# Patient Record
Sex: FEMALE | Race: BLACK OR AFRICAN AMERICAN | NOT HISPANIC OR LATINO | Employment: STUDENT | URBAN - METROPOLITAN AREA
[De-identification: names, ages, dates, MRNs, and addresses within clinical notes are randomized per-mention and may not be internally consistent; named-entity substitution may affect disease eponyms.]

---
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
## 2021-04-11 ENCOUNTER — APPOINTMENT (OUTPATIENT)
Dept: PREADMISSION TESTING | Facility: HOSPITAL | Age: 21
End: 2021-04-11

## 2021-04-11 PROCEDURE — U0004 COV-19 TEST NON-CDC HGH THRU: HCPCS

## 2021-04-11 PROCEDURE — C9803 HOPD COVID-19 SPEC COLLECT: HCPCS

## 2021-04-12 LAB — SARS-COV-2 RNA NOSE QL NAA+PROBE: NOT DETECTED

## 2023-07-10 PROBLEM — I10 HYPERTENSION: Status: ACTIVE | Noted: 2023-07-10

## 2023-07-10 PROBLEM — F41.9 ANXIETY: Status: ACTIVE | Noted: 2023-07-10

## 2023-07-10 PROBLEM — E28.2 PCOS (POLYCYSTIC OVARIAN SYNDROME): Status: ACTIVE | Noted: 2023-07-10

## 2023-07-26 ENCOUNTER — OFFICE VISIT (OUTPATIENT)
Dept: INTERNAL MEDICINE | Facility: CLINIC | Age: 23
End: 2023-07-26
Payer: MEDICAID

## 2023-07-26 ENCOUNTER — LAB (OUTPATIENT)
Dept: LAB | Facility: HOSPITAL | Age: 23
End: 2023-07-26
Payer: MEDICAID

## 2023-07-26 VITALS
OXYGEN SATURATION: 95 % | HEART RATE: 76 BPM | TEMPERATURE: 97.1 F | BODY MASS INDEX: 47.09 KG/M2 | WEIGHT: 293 LBS | HEIGHT: 66 IN | SYSTOLIC BLOOD PRESSURE: 128 MMHG | DIASTOLIC BLOOD PRESSURE: 88 MMHG

## 2023-07-26 DIAGNOSIS — M54.50 ACUTE MIDLINE LOW BACK PAIN WITHOUT SCIATICA: ICD-10-CM

## 2023-07-26 DIAGNOSIS — Z09 FOLLOW-UP EXAM: Primary | ICD-10-CM

## 2023-07-26 DIAGNOSIS — Z76.89 ENCOUNTER TO ESTABLISH CARE: ICD-10-CM

## 2023-07-26 DIAGNOSIS — Z13.29 SCREENING FOR HYPOTHYROIDISM: ICD-10-CM

## 2023-07-26 DIAGNOSIS — Z13.220 SCREENING FOR HYPERLIPIDEMIA: ICD-10-CM

## 2023-07-26 RX ORDER — KETOROLAC TROMETHAMINE 30 MG/ML
30 INJECTION, SOLUTION INTRAMUSCULAR; INTRAVENOUS ONCE
Status: COMPLETED | OUTPATIENT
Start: 2023-07-26 | End: 2023-07-26

## 2023-07-26 RX ORDER — DEXAMETHASONE SODIUM PHOSPHATE 10 MG/ML
10 INJECTION INTRAMUSCULAR; INTRAVENOUS ONCE
Status: COMPLETED | OUTPATIENT
Start: 2023-07-26 | End: 2023-07-26

## 2023-07-26 RX ORDER — TIZANIDINE 4 MG/1
4 TABLET ORAL EVERY 8 HOURS PRN
Qty: 15 TABLET | Refills: 1 | Status: SHIPPED | OUTPATIENT
Start: 2023-07-26

## 2023-07-26 RX ADMIN — DEXAMETHASONE SODIUM PHOSPHATE 10 MG: 10 INJECTION INTRAMUSCULAR; INTRAVENOUS at 11:09

## 2023-07-26 RX ADMIN — KETOROLAC TROMETHAMINE 30 MG: 30 INJECTION, SOLUTION INTRAMUSCULAR; INTRAVENOUS at 11:22

## 2023-07-27 LAB
ALBUMIN SERPL-MCNC: 4.5 G/DL (ref 3.5–5.2)
ALBUMIN/GLOB SERPL: 1.9 G/DL
ALP SERPL-CCNC: 71 U/L (ref 39–117)
ALT SERPL-CCNC: 23 U/L (ref 1–33)
AST SERPL-CCNC: 16 U/L (ref 1–32)
BILIRUB SERPL-MCNC: 0.8 MG/DL (ref 0–1.2)
BUN SERPL-MCNC: 7 MG/DL (ref 6–20)
BUN/CREAT SERPL: 8.8 (ref 7–25)
CALCIUM SERPL-MCNC: 9.6 MG/DL (ref 8.6–10.5)
CHLORIDE SERPL-SCNC: 103 MMOL/L (ref 98–107)
CHOLEST SERPL-MCNC: 236 MG/DL (ref 0–200)
CO2 SERPL-SCNC: 23.7 MMOL/L (ref 22–29)
CREAT SERPL-MCNC: 0.8 MG/DL (ref 0.57–1)
EGFRCR SERPLBLD CKD-EPI 2021: 106.3 ML/MIN/1.73
ERYTHROCYTE [DISTWIDTH] IN BLOOD BY AUTOMATED COUNT: 12.5 % (ref 12.3–15.4)
GLOBULIN SER CALC-MCNC: 2.4 GM/DL
GLUCOSE SERPL-MCNC: 91 MG/DL (ref 65–99)
HCT VFR BLD AUTO: 44.2 % (ref 34–46.6)
HDLC SERPL-MCNC: 37 MG/DL (ref 40–60)
HGB BLD-MCNC: 14.8 G/DL (ref 12–15.9)
LDLC SERPL CALC-MCNC: 180 MG/DL (ref 0–100)
MCH RBC QN AUTO: 30.3 PG (ref 26.6–33)
MCHC RBC AUTO-ENTMCNC: 33.5 G/DL (ref 31.5–35.7)
MCV RBC AUTO: 90.6 FL (ref 79–97)
PLATELET # BLD AUTO: 270 10*3/MM3 (ref 140–450)
POTASSIUM SERPL-SCNC: 4.9 MMOL/L (ref 3.5–5.2)
PROT SERPL-MCNC: 6.9 G/DL (ref 6–8.5)
RBC # BLD AUTO: 4.88 10*6/MM3 (ref 3.77–5.28)
SODIUM SERPL-SCNC: 141 MMOL/L (ref 136–145)
T4 FREE SERPL-MCNC: 1.06 NG/DL (ref 0.93–1.7)
TRIGL SERPL-MCNC: 103 MG/DL (ref 0–150)
TSH SERPL DL<=0.005 MIU/L-ACNC: 1.29 UIU/ML (ref 0.27–4.2)
VLDLC SERPL CALC-MCNC: 19 MG/DL (ref 5–40)
WBC # BLD AUTO: 4.75 10*3/MM3 (ref 3.4–10.8)

## 2023-08-12 NOTE — PROGRESS NOTES
Office Note     Name: Avel Pichardo    : 2000     MRN: [id number]     Chief Complaint  Back Pain (Hurt it last Thursday working out )    Subjective     History of Present Illness:  Avel Pichardo is a 23 y.o. female who presents today for a follow-up visit and for lab review.  Unable to do lab review today as patient forgot to have her labs done.  She does however have complaints of acute back pain.  Patient reports onset of symptoms was Thursday.  She reports she was dead lifting and her form was off.  She did not hear a pop.  She was trying to lift about 120 pounds.  She reports the pain is to her midline lumbar area.  She denies any sciatic pain.  She reports the discomfort feels muscular in nature.  Sitting and walking increased the pain.  She has tried stretching, Epsom salt baths, capsaicin cream, ice, IcyHot, Tylenol, ibuprofen, and naproxen for her symptoms with minimal relief.  She denies further complaints or concerns at this time.  Had a pleasant visit with the patient today.      Past Medical History:   Diagnosis Date    Adiposity 2016    Airway hyperreactivity 2016    Allergic rhinitis 2016    Anxiety     Depression     Hyperlipemia     Hypertension     Ovarian cyst     PAC (premature atrial contraction)        Past Surgical History:   Procedure Laterality Date    NO PAST SURGERIES      WISDOM TOOTH EXTRACTION Bilateral        Social History     Socioeconomic History    Marital status: Single   Tobacco Use    Smoking status: Never    Smokeless tobacco: Never   Substance and Sexual Activity    Alcohol use: No    Drug use: Never    Sexual activity: Not Currently     Partners: Male     Birth control/protection: Abstinence, Birth control pill         Current Outpatient Medications:     amLODIPine (NORVASC) 10 MG tablet, Take 1 tablet by mouth Daily., Disp: , Rfl:     citalopram (CeleXA) 20 MG tablet, TAKE 1 TABLET (20 MG TOTAL) BY MOUTH EVERY MORNING FOR 90 DAYS, Disp: ,  "Rfl:     ergocalciferol (ERGOCALCIFEROL) 1.25 MG (81893 UT) capsule, Take 1 capsule by mouth Every 7 (Seven) Days., Disp: , Rfl:     hydrOXYzine pamoate (VISTARIL) 25 MG capsule, Take 1 capsule by mouth Daily., Disp: , Rfl:     metFORMIN (GLUCOPHAGE) 500 MG tablet, Take 1 tablet by mouth 2 (Two) Times a Day With Meals., Disp: , Rfl:     norethindrone (MICRONOR) 0.35 MG tablet, Take 1 tablet by mouth Daily., Disp: 28 tablet, Rfl: 2    propranolol (INDERAL) 20 MG tablet, Take 2 tablets by mouth., Disp: , Rfl:     tiZANidine (Zanaflex) 4 MG tablet, Take 1 tablet by mouth Every 8 (Eight) Hours As Needed for Muscle Spasms., Disp: 15 tablet, Rfl: 1    Objective     Vital Signs  /88   Pulse 76   Temp 97.1 øF (36.2 øC)   Ht 167.6 cm (65.98\")   Wt (!) 141 kg (310 lb)   SpO2 95%   BMI 50.06 kg/mý   Estimated body mass index is 50.06 kg/mý as calculated from the following:    Height as of this encounter: 167.6 cm (65.98\").    Weight as of this encounter: 141 kg (310 lb).           Physical Exam  Constitutional:       General: She is not in acute distress.     Appearance: Normal appearance. She is not ill-appearing.   HENT:      Head: Normocephalic and atraumatic.      Nose: Nose normal.   Eyes:      Extraocular Movements: Extraocular movements intact.      Conjunctiva/sclera: Conjunctivae normal.      Pupils: Pupils are equal, round, and reactive to light.   Cardiovascular:      Rate and Rhythm: Normal rate.   Pulmonary:      Effort: Pulmonary effort is normal. No respiratory distress.   Musculoskeletal:         General: Tenderness present.      Cervical back: Neck supple.      Comments: Antalgic gait noted   Skin:     General: Skin is warm and dry.   Neurological:      General: No focal deficit present.      Mental Status: She is alert and oriented to person, place, and time. Mental status is at baseline.   Psychiatric:         Mood and Affect: Mood normal.         Behavior: Behavior normal.         Thought " Content: Thought content normal.         Judgment: Judgment normal.        Assessment and Plan     Diagnoses and all orders for this visit:    1. Follow-up exam (Primary)    2. Acute midline low back pain without sciatica  -     ketorolac (TORADOL) injection 30 mg  -     dexamethasone (DECADRON) injection 10 mg  -     tiZANidine (Zanaflex) 4 MG tablet; Take 1 tablet by mouth Every 8 (Eight) Hours As Needed for Muscle Spasms.  Dispense: 15 tablet; Refill: 1    Plan:  Toradol 30 mg IM x1 in the office today.  Decadron 10 mg IM x1 in the office today.  Prescription for Zanaflex sent to the pharmacy to use as needed for low back pain.  Patient agreeable to have labs drawn today.  Will review labs with patient once received.  Further plan of care based on lab result findings.  Plan for follow-up in about 4 weeks.  Return to clinic sooner if needed.    Follow Up  Return in about 4 weeks (around 8/23/2023) for Recheck.    JANETTE Hale    Part of this note may be an electronic transcription/translation of spoken language to printed text using the Dragon Dictation System.Answers submitted by the patient for this visit:  Primary Reason for Visit (Submitted on 7/25/2023)  What is the primary reason for your visit?: Back Pain  Back Pain Questionnaire (Submitted on 7/25/2023)  Chief Complaint: Back pain  Chronicity: new  Onset: in the past 7 days  Frequency: constantly  Progression since onset: gradually worsening  Pain location: sacro-iliac  Pain quality: aching, shooting  Radiates to: does not radiate  Pain - numeric: 8/10  Pain is: the same all the time  Aggravated by: bending, coughing, position, lying down, sitting, standing, stress, twisting  Stiffness is present: all day  abdominal pain: No  bladder incontinence: No  bowel incontinence: No  chest pain: No  dysuria: No  fever: No  headaches: No  leg pain: No  numbness: No  paresis: No  paresthesias: No  pelvic pain: No  perianal numbness: No  tingling: Yes  weakness:  Yes  weight loss: No  Risk factors: obesity, recent trauma

## 2023-09-07 DIAGNOSIS — R73.01 IMPAIRED FASTING GLUCOSE: ICD-10-CM

## 2023-09-07 DIAGNOSIS — M25.50 ARTHRALGIA, UNSPECIFIED JOINT: ICD-10-CM

## 2023-09-07 DIAGNOSIS — E28.2 PCOS (POLYCYSTIC OVARIAN SYNDROME): Primary | ICD-10-CM

## 2023-09-14 ENCOUNTER — TELEPHONE (OUTPATIENT)
Dept: INTERNAL MEDICINE | Facility: CLINIC | Age: 23
End: 2023-09-14

## 2023-09-14 NOTE — TELEPHONE ENCOUNTER
Caller: Avel Pichardo    Relationship: Self    Best call back number: [phone]     What is the best time to reach you: ANY BEFORE 11:30-9 PM    Who are you requesting to speak with (clinical staff, provider,  specific staff member): TAN VILLALTA    What was the call regarding: THE PATIENT WANTED TO LET THE OFFICE KNOW THAT THE REASON SHE MISSED HER APPOINTMENT THIS MORNING 09.14.23 WAS BECAUSE SHE GOT INTO A FENDER HILL ON THE WAY TO THE OFFICE. SHE SAID SHE IS OK BUT WAS NOT ABLE TO MAKE IT IN. SHE WILL RESCHEDULE THROUGH [employer]. IF SHE HAS TROUBLE WITH THAT THE PATIENT SAID THAT SHE WILL CALL THE SCHEDULE. IF TAN VILLALTA HAS QUESTIONS FOR HER OR NEEDS TO SEE HER SOONER PLEASE CALL HER.     Is it okay if the provider responds through [employer]: NO

## 2023-09-26 ENCOUNTER — TELEPHONE (OUTPATIENT)
Dept: OBSTETRICS AND GYNECOLOGY | Facility: CLINIC | Age: 23
End: 2023-09-26
Payer: MEDICAID

## 2023-09-27 ENCOUNTER — OFFICE VISIT (OUTPATIENT)
Dept: OBSTETRICS AND GYNECOLOGY | Facility: CLINIC | Age: 23
End: 2023-09-27
Payer: MEDICAID

## 2023-09-27 VITALS
WEIGHT: 293 LBS | SYSTOLIC BLOOD PRESSURE: 116 MMHG | HEIGHT: 66 IN | BODY MASS INDEX: 47.09 KG/M2 | DIASTOLIC BLOOD PRESSURE: 72 MMHG

## 2023-09-27 DIAGNOSIS — Z01.419 ROUTINE GYNECOLOGICAL EXAMINATION: Primary | ICD-10-CM

## 2023-09-27 DIAGNOSIS — Z30.09 GENERAL COUNSELING AND ADVICE ON FEMALE CONTRACEPTION: ICD-10-CM

## 2023-09-27 NOTE — PROGRESS NOTES
Gynecologic Annual Exam Note        Establish Care        Subjective     HPI  Avel Pichardo is a 23 y.o.  female who presents for annual well woman exam as a new patient. There were no changes to her medical or surgical history since her last visit.. Patient reports problems with: none. No LMP recorded. (Menstrual status: Oral contraceptives).  Her periods are irregular . Patient reports flow is heavy. She reports dysmenorrhea is none.     Partner Status: Marital Status: single.  She has never been sexually active. STD testing recommendations have been explained to the patient and she does desire STD testing.    Additional OB/GYN History   Current contraception: contraceptive methods: Abstinence  Desires to: start contraception  Thromboembolic Disease: none  Age of menarche: 14-15    History of STD: no    Last Pap : Never.   Last Completed Pap Smear       This patient has no relevant Health Maintenance data.             History of abnormal Pap smear:  n/a  Gardasil status:uncertain if she received the vaccine  Family history of uterine, colon, breast, or ovarian cancer: no  Performs monthly Self-Breast Exam: no  Exercises Regularly:yes  Feelings of Anxiety or Depression: yes - managed with medication  Tobacco Usage?: No       Current Outpatient Medications:     amLODIPine (NORVASC) 10 MG tablet, Take 1 tablet by mouth Daily., Disp: , Rfl:     citalopram (CeleXA) 20 MG tablet, TAKE 1 TABLET (20 MG TOTAL) BY MOUTH EVERY MORNING FOR 90 DAYS, Disp: , Rfl:     hydrOXYzine pamoate (VISTARIL) 25 MG capsule, Take 1 capsule by mouth Daily., Disp: , Rfl:      Patient denies the need for medication refills today.    OB History          0    Para   0    Term   0       0    AB   0    Living   0         SAB   0    IAB   0    Ectopic   0    Molar   0    Multiple   0    Live Births   0                Health Maintenance   Topic Date Due    Annual Gynecologic Pelvic and Breast Exam  Never done     "Pneumococcal Vaccine 0-64 (1 - PCV) Never done    HPV VACCINES (1 - 2-dose series) Never done    HEPATITIS C SCREENING  Never done    CHLAMYDIA SCREENING  Never done    PAP SMEAR  Never done    ANNUAL PHYSICAL  02/09/2019    TDAP/TD VACCINES (2 - Td or Tdap) 04/08/2021    COVID-19 Vaccine (4 - Pfizer series) 05/07/2022    INFLUENZA VACCINE  10/01/2023    BMI FOLLOWUP  06/27/2024    LIPID PANEL  07/26/2024       Past Medical History:   Diagnosis Date    Adiposity 08/04/2016    Airway hyperreactivity 08/04/2016    Allergic rhinitis 08/04/2016    Anxiety     Asthma     Depression     Eczema     Hyperlipemia     Hypertension     Lactose intolerance     Needle phobia     Obstructive sleep apnea     PAC (premature atrial contraction)     PCOS (polycystic ovarian syndrome)     Polycystic ovary syndrome         Past Surgical History:   Procedure Laterality Date    COSMETIC SURGERY Right     birth sammie removal-right cheek    WISDOM TOOTH EXTRACTION Bilateral        The additional following portions of the patient's history were reviewed and updated as appropriate: allergies, current medications, past family history, past medical history, past social history, and past surgical history.    Review of Systems   Constitutional: Negative.    HENT: Negative.     Eyes: Negative.    Respiratory: Negative.     Cardiovascular: Negative.    Gastrointestinal: Negative.    Endocrine: Negative.    Genitourinary: Negative.    Musculoskeletal: Negative.    Skin: Negative.    Allergic/Immunologic: Negative.    Neurological: Negative.    Hematological: Negative.    Psychiatric/Behavioral: Negative.         I have reviewed and agree with the HPI, ROS, and historical information as entered above. Aram Ventura, APRN          Objective   /72 (BP Location: Right arm, Patient Position: Sitting, Cuff Size: Adult)   Ht 167.6 cm (65.98\")   Wt 136 kg (299 lb 9.6 oz)   BMI 48.38 kg/m²     Physical Exam  Vitals and nursing note " reviewed. Exam conducted with a chaperone present.   Constitutional:       Appearance: Normal appearance. She is well-developed and normal weight.   HENT:      Head: Normocephalic and atraumatic.   Neck:      Thyroid: No thyroid mass or thyromegaly.   Cardiovascular:      Rate and Rhythm: Normal rate.      Heart sounds: No murmur heard.  Pulmonary:      Effort: Pulmonary effort is normal. No retractions.      Breath sounds: No wheezing, rhonchi or rales.   Chest:      Chest wall: No mass or tenderness.   Breasts:     Right: Normal. No mass, nipple discharge, skin change or tenderness.      Left: Normal. No mass, nipple discharge, skin change or tenderness.   Abdominal:      Palpations: Abdomen is soft. Abdomen is not rigid. There is no mass.      Tenderness: There is no abdominal tenderness. There is no guarding.      Hernia: No hernia is present.   Genitourinary:     General: Normal vulva.      Exam position: Lithotomy position.      Labia:         Right: No rash, tenderness or lesion.         Left: No rash, tenderness or lesion.       Vagina: Normal. No vaginal discharge or lesions.      Cervix: No cervical motion tenderness, discharge, lesion or cervical bleeding.      Uterus: Normal. Not enlarged, not fixed and not tender.       Adnexa: Right adnexa normal and left adnexa normal.        Right: No mass or tenderness.          Left: No mass or tenderness.        Rectum: Normal. No external hemorrhoid.   Musculoskeletal:      Cervical back: Normal range of motion. No muscular tenderness.   Neurological:      Mental Status: She is alert and oriented to person, place, and time.   Psychiatric:         Behavior: Behavior normal.          Assessment and Plan    Problem List Items Addressed This Visit    None  Visit Diagnoses       Routine gynecological examination    -  Primary    Relevant Orders    LIQUID-BASED PAP SMEAR WITH HPV GENOTYPING IF ASCUS ([facility])    General counseling and advice on female contraception  "               GYN annual well woman exam.   Contraception: Avel has not been taking the pop because she says she often forgets. She also says she doesn't \"feel like herself\" while taking it. She is interested in Kyleena IUD and we discussed benefits, risks, and insertion. No unprotected IC 2 weeks prior to insertion. She will schedule this. Information brochure given to pt.  Reviewed pap guidelines. Pap done today.  Encouraged use of condoms for STD prevention.  Fibrocystic breast changes - Encouraged decreasing caffeine, supportive bra, low dose vitamin E supplementation.  Reviewed monthly self breast exams.  Instructed to call with lumps, pain, or breast discharge.    Reviewed exercise as a preventative health measures.   Reccommended Flu Vaccine in Fall of each year.  RTC in 1 year or PRN with problems      Aram Ventura, APRN  09/27/2023         "

## 2023-10-02 LAB — REF LAB TEST METHOD: NORMAL

## 2023-10-24 ENCOUNTER — OFFICE VISIT (OUTPATIENT)
Dept: INTERNAL MEDICINE | Facility: CLINIC | Age: 23
End: 2023-10-24
Payer: MEDICAID

## 2023-10-24 VITALS
HEART RATE: 74 BPM | OXYGEN SATURATION: 97 % | WEIGHT: 293 LBS | BODY MASS INDEX: 47.09 KG/M2 | SYSTOLIC BLOOD PRESSURE: 128 MMHG | TEMPERATURE: 96.8 F | HEIGHT: 66 IN | DIASTOLIC BLOOD PRESSURE: 84 MMHG

## 2023-10-24 DIAGNOSIS — Z23 NEED FOR INFLUENZA VACCINATION: ICD-10-CM

## 2023-10-24 DIAGNOSIS — Z09 FOLLOW-UP EXAMINATION: Primary | ICD-10-CM

## 2023-10-24 DIAGNOSIS — I10 HYPERTENSION, UNSPECIFIED TYPE: ICD-10-CM

## 2023-10-24 DIAGNOSIS — G47.9 SLEEP DISTURBANCE: ICD-10-CM

## 2023-10-24 DIAGNOSIS — F41.9 ANXIETY: ICD-10-CM

## 2023-10-24 DIAGNOSIS — E55.9 VITAMIN D DEFICIENCY: ICD-10-CM

## 2023-10-24 RX ORDER — CITALOPRAM 20 MG/1
20 TABLET ORAL DAILY
Qty: 90 TABLET | Refills: 1 | Status: SHIPPED | OUTPATIENT
Start: 2023-10-24

## 2023-10-24 RX ORDER — CITALOPRAM 20 MG/1
20 TABLET ORAL DAILY
Qty: 90 TABLET | Refills: 1 | Status: SHIPPED | OUTPATIENT
Start: 2023-10-24 | End: 2023-10-24 | Stop reason: SDUPTHER

## 2023-10-24 RX ORDER — AMLODIPINE BESYLATE 10 MG/1
10 TABLET ORAL DAILY
Qty: 90 TABLET | Refills: 1 | Status: SHIPPED | OUTPATIENT
Start: 2023-10-24 | End: 2023-10-24 | Stop reason: SDUPTHER

## 2023-10-24 RX ORDER — AMLODIPINE BESYLATE 10 MG/1
10 TABLET ORAL DAILY
Qty: 90 TABLET | Refills: 1 | Status: SHIPPED | OUTPATIENT
Start: 2023-10-24

## 2023-10-24 RX ORDER — HYDROXYZINE PAMOATE 25 MG/1
25 CAPSULE ORAL DAILY
Qty: 90 CAPSULE | Refills: 1 | Status: SHIPPED | OUTPATIENT
Start: 2023-10-24

## 2023-10-24 RX ORDER — HYDROXYZINE PAMOATE 25 MG/1
25 CAPSULE ORAL DAILY
Qty: 90 CAPSULE | Refills: 1 | Status: SHIPPED | OUTPATIENT
Start: 2023-10-24 | End: 2023-10-24 | Stop reason: SDUPTHER

## 2023-11-13 NOTE — PROGRESS NOTES
Office Note     Name: Avel Pichardo    : 2000     MRN: [id number]     Chief Complaint  Follow-up and Hypertension    Subjective     History of Present Illness:  Avel Pichardo is a 23 y.o. female who presents today for for routine follow-up visit.  Patient also presents for medication refills.  Patient is currently taking amlodipine 10 mg daily for blood pressure control.  She is also taking citalopram 20 mg daily as well as hydroxyzine 25 mg daily as needed for anxiety.  Patient has been doing well on these medications however, she has been out of medications for about 1 month now.  She has made some dietary modifications.  She has been eating a high-protein diet with mainly the crockpot chicken and beans.  She has lost about 14 pounds.  She has also been doing weightlifting consistently.  She feels she has not been sleeping well.  She is also interested in possibly starting oral birth control to assist with PCOS and menstrual regulation.  She had a Pap smear on  which was negative.  She does follow with gynecology and is agreeable to discuss this with them.  She would like to have her medication refill sent to [facility] on [address] as she has recently moved.  Overall, the patient is doing well without current complaints or concerns.  Pleasant visit with the patient today.      Past Medical History:   Diagnosis Date    Adiposity 2016    Airway hyperreactivity 2016    Allergic rhinitis 2016    Anxiety     Asthma     Depression     Eczema     Hyperlipemia     Hypertension     Lactose intolerance     Needle phobia     Obstructive sleep apnea     PAC (premature atrial contraction)     PCOS (polycystic ovarian syndrome)     Polycystic ovary syndrome        Past Surgical History:   Procedure Laterality Date    COSMETIC SURGERY Right     birth sammie removal-right cheek    WISDOM TOOTH EXTRACTION Bilateral        Social History     Socioeconomic History    Marital status: Single  "  Tobacco Use    Smoking status: Never    Smokeless tobacco: Never   Vaping Use    Vaping Use: Never used   Substance and Sexual Activity    Alcohol use: Not Currently    Drug use: Never    Sexual activity: Not Currently     Partners: Male     Birth control/protection: Abstinence, Birth control pill         Current Outpatient Medications:     amLODIPine (NORVASC) 10 MG tablet, Take 1 tablet by mouth Daily., Disp: 90 tablet, Rfl: 1    Cholecalciferol 50 MCG (2000 UT) capsule, Take 1 capsule by mouth Daily., Disp: 90 each, Rfl: 1    citalopram (CeleXA) 20 MG tablet, Take 1 tablet by mouth Daily., Disp: 90 tablet, Rfl: 1    hydrOXYzine pamoate (VISTARIL) 25 MG capsule, Take 1 capsule by mouth Daily., Disp: 90 capsule, Rfl: 1    Objective     Vital Signs  /84   Pulse 74   Temp 96.8 °F (36 °C)   Ht 167.6 cm (65.98\")   Wt 135 kg (298 lb)   SpO2 97%   BMI 48.12 kg/m²   Estimated body mass index is 48.12 kg/m² as calculated from the following:    Height as of this encounter: 167.6 cm (65.98\").    Weight as of this encounter: 135 kg (298 lb).           Physical Exam  Constitutional:       General: She is not in acute distress.     Appearance: Normal appearance. She is not ill-appearing.   HENT:      Head: Normocephalic and atraumatic.      Nose: Nose normal.   Eyes:      Extraocular Movements: Extraocular movements intact.      Conjunctiva/sclera: Conjunctivae normal.      Pupils: Pupils are equal, round, and reactive to light.   Cardiovascular:      Rate and Rhythm: Normal rate.   Pulmonary:      Effort: Pulmonary effort is normal. No respiratory distress.   Musculoskeletal:         General: Normal range of motion.      Cervical back: Neck supple.   Skin:     General: Skin is warm and dry.   Neurological:      General: No focal deficit present.      Mental Status: She is alert and oriented to person, place, and time. Mental status is at baseline.   Psychiatric:         Mood and Affect: Mood normal.         " Behavior: Behavior normal.         Thought Content: Thought content normal.         Judgment: Judgment normal.          Assessment and Plan     Diagnoses and all orders for this visit:    1. Follow-up examination (Primary)    2. Anxiety  -     Discontinue: citalopram (CeleXA) 20 MG tablet; Take 1 tablet by mouth Daily.  Dispense: 90 tablet; Refill: 1  -     Discontinue: hydrOXYzine pamoate (VISTARIL) 25 MG capsule; Take 1 capsule by mouth Daily.  Dispense: 90 capsule; Refill: 1  -     citalopram (CeleXA) 20 MG tablet; Take 1 tablet by mouth Daily.  Dispense: 90 tablet; Refill: 1  -     hydrOXYzine pamoate (VISTARIL) 25 MG capsule; Take 1 capsule by mouth Daily.  Dispense: 90 capsule; Refill: 1    3. Hypertension, unspecified type  -     Discontinue: amLODIPine (NORVASC) 10 MG tablet; Take 1 tablet by mouth Daily.  Dispense: 90 tablet; Refill: 1  -     amLODIPine (NORVASC) 10 MG tablet; Take 1 tablet by mouth Daily.  Dispense: 90 tablet; Refill: 1    4. Sleep disturbance  -     Discontinue: hydrOXYzine pamoate (VISTARIL) 25 MG capsule; Take 1 capsule by mouth Daily.  Dispense: 90 capsule; Refill: 1  -     hydrOXYzine pamoate (VISTARIL) 25 MG capsule; Take 1 capsule by mouth Daily.  Dispense: 90 capsule; Refill: 1    5. Vitamin D deficiency  -     Discontinue: Cholecalciferol 50 MCG (2000 UT) capsule; Take 1 capsule by mouth Daily.  Dispense: 90 each; Refill: 1  -     Cholecalciferol 50 MCG (2000 UT) capsule; Take 1 capsule by mouth Daily.  Dispense: 90 each; Refill: 1    6. Need for influenza vaccination  -     Fluzone (or Fluarix & Flulaval for VFC) >6mos        Follow Up  Return in about 3 months (around 1/24/2024) for Recheck.    JANETTE Hale    Part of this note may be an electronic transcription/translation of spoken language to printed text using the Dragon Dictation System.

## 2023-12-13 ENCOUNTER — OFFICE VISIT (OUTPATIENT)
Dept: OBSTETRICS AND GYNECOLOGY | Facility: CLINIC | Age: 23
End: 2023-12-13
Payer: MEDICAID

## 2023-12-13 VITALS
WEIGHT: 293 LBS | HEIGHT: 66 IN | DIASTOLIC BLOOD PRESSURE: 88 MMHG | SYSTOLIC BLOOD PRESSURE: 132 MMHG | BODY MASS INDEX: 47.09 KG/M2

## 2023-12-13 DIAGNOSIS — L68.0 FEMALE HIRSUTISM: ICD-10-CM

## 2023-12-13 DIAGNOSIS — E28.2 PCOS (POLYCYSTIC OVARIAN SYNDROME): ICD-10-CM

## 2023-12-13 DIAGNOSIS — R10.2 PELVIC PAIN: Primary | ICD-10-CM

## 2023-12-13 RX ORDER — PROPRANOLOL HYDROCHLORIDE 40 MG/1
TABLET ORAL
COMMUNITY

## 2023-12-13 RX ORDER — ACETAMINOPHEN AND CODEINE PHOSPHATE 120; 12 MG/5ML; MG/5ML
SOLUTION ORAL EVERY 24 HOURS
COMMUNITY
Start: 2023-11-29 | End: 2023-12-13 | Stop reason: SDUPTHER

## 2023-12-13 RX ORDER — ACETAMINOPHEN AND CODEINE PHOSPHATE 120; 12 MG/5ML; MG/5ML
1 SOLUTION ORAL EVERY 24 HOURS
Qty: 84 TABLET | Refills: 4 | Status: SHIPPED | OUTPATIENT
Start: 2023-12-13

## 2023-12-13 NOTE — PROGRESS NOTES
Chief Complaint   Patient presents with    Contraception         Subjective   HPI  Avel Pichardo is a 23 y.o. female, ,  No LMP recorded (lmp unknown). (Menstrual status: Oral contraceptives). LMP > 1 yr ago secondary to PCOS. She presents for birth control counseling. She went to the [facility] 2023 and was started on Jencycla- POP.     She states she was previously on Micronor a yr ago and cycles started immediately when she began taking. Her cycle has not started since starting this new OC 2 weeks ago. When she was younger POPs were used to make her have a cycle Q 3 months. Reports more chest hair since starting this pill 2 weeks ago.     Additional problems include RLQ pain that started last week, rated 2/10, pressure. Has not tried anything to mk pain better. Denies constipation and dysuria. Hx of ovarian cyst in .    Her periods are irregular . Patient reports she can not remember the last time she had a period. She reports dysmenorrhea is none.     Partner Status: Marital Status: single. She is sexually active. She has not had new partners.    Additional OB/GYN History   Thromboembolic Disease: none  History of hypertension: no  History of migraines:  none  Tobacco Usage?: No   Age of menarche: 14-15    Last Pap : 2023-ASCUS HPV POOL: Negative   Last Completed Pap Smear            PAP SMEAR (Yearly) Next due on 2023  LIQUID-BASED PAP SMEAR WITH HPV GENOTYPING IF ASCUS (ALETHA,COR,MAD)                  History of abnormal Pap smear: no      Current Outpatient Medications:     norethindrone (MICRONOR) 0.35 MG tablet, Take 1 tablet by mouth Daily., Disp: 84 tablet, Rfl: 4    amLODIPine (NORVASC) 10 MG tablet, Take 1 tablet by mouth Daily., Disp: 90 tablet, Rfl: 1    Cholecalciferol 50 MCG (2000) capsule, Take 1 capsule by mouth Daily., Disp: 90 each, Rfl: 1    citalopram (CeleXA) 20 MG tablet, Take 1 tablet by mouth Daily., Disp: 90 tablet, Rfl: 1     "hydrOXYzine pamoate (VISTARIL) 25 MG capsule, Take 1 capsule by mouth Daily., Disp: 90 capsule, Rfl: 1    propranolol (INDERAL) 40 MG tablet, TAKE 1 TABLET (40MG TOTAL) BY MOUTH DAILY. Oral for 30 Days, Disp: , Rfl:      Past Medical History:   Diagnosis Date    Adiposity 08/04/2016    Airway hyperreactivity 08/04/2016    Allergic rhinitis 08/04/2016    Anxiety     Asthma     Depression     Eczema     Hyperlipemia     Hypertension     Lactose intolerance     Needle phobia     Obstructive sleep apnea     PAC (premature atrial contraction)     PCOS (polycystic ovarian syndrome)     Polycystic ovary syndrome         Past Surgical History:   Procedure Laterality Date    COSMETIC SURGERY Right     birth sammie removal-right cheek    WISDOM TOOTH EXTRACTION Bilateral        The additional following portions of the patient's history were reviewed and updated as appropriate: allergies and current medications.    Review of Systems   Respiratory: Negative.  Negative for shortness of breath.    Cardiovascular: Negative.  Negative for chest pain.   Gastrointestinal:  Negative for abdominal distention, abdominal pain and constipation.   Genitourinary:  Positive for amenorrhea, menstrual problem and pelvic pressure (Right side). Negative for dyspareunia, dysuria, pelvic pain, urinary incontinence, vaginal bleeding, vaginal discharge and vaginal pain.   Skin:         Hirsutism    Psychiatric/Behavioral:  Positive for depressed mood. Negative for suicidal ideas. The patient is nervous/anxious.         Controlled w/ medication         I have reviewed and agree with the HPI, ROS, and historical information as entered above. Jenny Coleman, APRN      Objective   /88   Ht 167.6 cm (65.98\")   Wt (!) 138 kg (303 lb 6.4 oz)   LMP  (LMP Unknown)   BMI 48.99 kg/m²     Physical Exam  Vitals and nursing note reviewed.   Constitutional:       General: She is not in acute distress.     Appearance: Normal appearance. She is obese. She " is not ill-appearing or toxic-appearing.   HENT:      Head: Normocephalic and atraumatic.   Pulmonary:      Effort: Pulmonary effort is normal.   Abdominal:      Palpations: Abdomen is soft. There is no mass.      Tenderness: There is no abdominal tenderness. There is no guarding or rebound.      Hernia: No hernia is present.   Neurological:      Mental Status: She is alert and oriented to person, place, and time.   Psychiatric:         Mood and Affect: Mood normal.         Behavior: Behavior normal.         Assessment & Plan     Assessment     Problem List Items Addressed This Visit       Female hirsutism    Overview     Begin Spironolactone. Will begin with 50 mg twice daily and increase to 100 mg twice daily as needed. Will assess response at 6-month intervals before adjusting dose.    Informed contraception is essential because, if pregnancy occurs, an antiandrogen such as spironolactone could prevent development of normal external genitalia in a male fetus. Pt VU.     CMP @ next f/u         PCOS (polycystic ovarian syndrome)    Overview     Not a great candidate for JOHNATHAN due to BMI 48.99, HLD, HTN, PCOS (high risk for DM). Will continue POP and begin Spironolactone for hirsutism.           Relevant Medications    norethindrone (MICRONOR) 0.35 MG tablet     Other Visit Diagnoses       Pelvic pain    -  Primary    Relevant Orders    US Non-ob Transvaginal            Counseling on alternative methods of birth control provided. She previously discussed and IUD with JANETTE Galo but has decided against that. She would like to continue POP.   Micrnor rfs sent. Encouraged to give OCP 3-4 months to regulate cycles.  Pelvic pain- No pain upon palpation during PE. She will begin Tylenol, NSAIDs, and heating pad prn mild to moderate pain, ED for severe pain, and return in 1 month for pelvic US.   Counseling on prevention of sexually transmitted diseases provided.  Hirsutism-see plan above   Encouraged weight loss  and diet change to improve PCOS symptoms and regulate cycles.  Return in about 1 month (around 1/13/2024) for U/S and appt for Pelvic pain.        Jenny Coleman, APRN  12/13/2023

## 2023-12-14 PROBLEM — L68.0 FEMALE HIRSUTISM: Status: ACTIVE | Noted: 2023-12-14

## 2023-12-15 ENCOUNTER — TELEPHONE (OUTPATIENT)
Dept: OBSTETRICS AND GYNECOLOGY | Facility: CLINIC | Age: 23
End: 2023-12-15
Payer: MEDICAID

## 2023-12-19 DIAGNOSIS — L68.0 FEMALE HIRSUTISM: Primary | ICD-10-CM

## 2023-12-19 RX ORDER — SPIRONOLACTONE 50 MG/1
50 TABLET, FILM COATED ORAL 2 TIMES DAILY
Qty: 60 TABLET | Refills: 2 | Status: SHIPPED | OUTPATIENT
Start: 2023-12-19

## 2024-01-12 DIAGNOSIS — L68.0 FEMALE HIRSUTISM: ICD-10-CM

## 2024-01-12 RX ORDER — SPIRONOLACTONE 50 MG/1
50 TABLET, FILM COATED ORAL 2 TIMES DAILY
Qty: 180 TABLET | Refills: 0 | Status: SHIPPED | OUTPATIENT
Start: 2024-01-12

## 2024-02-25 DIAGNOSIS — F41.9 ANXIETY: ICD-10-CM

## 2024-02-25 DIAGNOSIS — I10 HYPERTENSION, UNSPECIFIED TYPE: ICD-10-CM

## 2024-02-25 DIAGNOSIS — G47.9 SLEEP DISTURBANCE: ICD-10-CM

## 2024-02-26 RX ORDER — AMLODIPINE BESYLATE 10 MG/1
10 TABLET ORAL DAILY
Qty: 90 TABLET | Refills: 1 | OUTPATIENT
Start: 2024-02-26

## 2024-02-26 RX ORDER — CITALOPRAM 20 MG/1
20 TABLET ORAL DAILY
Qty: 90 TABLET | Refills: 1 | OUTPATIENT
Start: 2024-02-26

## 2024-02-26 RX ORDER — HYDROXYZINE PAMOATE 25 MG/1
25 CAPSULE ORAL DAILY
Qty: 90 CAPSULE | Refills: 1 | OUTPATIENT
Start: 2024-02-26

## 2024-05-29 ENCOUNTER — OFFICE VISIT (OUTPATIENT)
Dept: INTERNAL MEDICINE | Facility: CLINIC | Age: 24
End: 2024-05-29
Payer: COMMERCIAL

## 2024-05-29 VITALS
HEIGHT: 66 IN | SYSTOLIC BLOOD PRESSURE: 130 MMHG | WEIGHT: 293 LBS | DIASTOLIC BLOOD PRESSURE: 81 MMHG | HEART RATE: 78 BPM | OXYGEN SATURATION: 96 % | BODY MASS INDEX: 47.09 KG/M2

## 2024-05-29 DIAGNOSIS — E78.5 HYPERLIPIDEMIA, UNSPECIFIED HYPERLIPIDEMIA TYPE: ICD-10-CM

## 2024-05-29 DIAGNOSIS — F41.9 ANXIETY: Primary | ICD-10-CM

## 2024-05-29 DIAGNOSIS — Z71.3 ENCOUNTER FOR DIETARY CONSULTATION: ICD-10-CM

## 2024-05-29 DIAGNOSIS — I10 HYPERTENSION, UNSPECIFIED TYPE: ICD-10-CM

## 2024-05-29 DIAGNOSIS — M79.672 LEFT FOOT PAIN: ICD-10-CM

## 2024-05-29 DIAGNOSIS — F32.A DEPRESSION, UNSPECIFIED DEPRESSION TYPE: ICD-10-CM

## 2024-05-29 DIAGNOSIS — E28.2 PCOS (POLYCYSTIC OVARIAN SYNDROME): ICD-10-CM

## 2024-05-29 DIAGNOSIS — L68.0 FEMALE HIRSUTISM: ICD-10-CM

## 2024-05-29 DIAGNOSIS — R29.898 ANKLE WEAKNESS: ICD-10-CM

## 2024-05-29 RX ORDER — SPIRONOLACTONE 50 MG/1
50 TABLET, FILM COATED ORAL 2 TIMES DAILY
Qty: 180 TABLET | Refills: 1 | Status: SHIPPED | OUTPATIENT
Start: 2024-05-29

## 2024-05-29 RX ORDER — AMLODIPINE BESYLATE 5 MG/1
5 TABLET ORAL DAILY
Qty: 90 TABLET | Refills: 1 | Status: SHIPPED | OUTPATIENT
Start: 2024-05-29

## 2024-05-29 RX ORDER — ACETAMINOPHEN AND CODEINE PHOSPHATE 120; 12 MG/5ML; MG/5ML
1 SOLUTION ORAL EVERY 24 HOURS
Qty: 84 TABLET | Refills: 1 | Status: SHIPPED | OUTPATIENT
Start: 2024-05-29

## 2024-05-29 RX ORDER — PROPRANOLOL HYDROCHLORIDE 10 MG/1
10 TABLET ORAL 2 TIMES DAILY PRN
Qty: 60 TABLET | Refills: 2 | Status: SHIPPED | OUTPATIENT
Start: 2024-05-29

## 2024-05-29 RX ORDER — CITALOPRAM 20 MG/1
20 TABLET ORAL DAILY
Qty: 90 TABLET | Refills: 1 | Status: SHIPPED | OUTPATIENT
Start: 2024-05-29

## 2024-05-29 NOTE — PROGRESS NOTES
Office Note     Name: Avel Pichardo    : 2000     MRN: [id number]     Chief Complaint  Hyperlipidemia, Hypertension, and Anxiety    Subjective     History of Present Illness:  Avel Pichardo is a 24 y.o. female who presents today for a routine follow-up visit today.  Patient reports having multiple insurance changes over the past 6 months.  Her insurance is changed 3 times recently.  She has not been taking any of her scheduled medications for a while.  In college she had lots of issues with her left ankle.  She was seen by foot doctor at that time and told she had an extra bone in her foot.  She has previously torn a missed meniscus on her left side as well.  She was told that the extra bone is sitting on a nerve in her left leg.  She has been experiencing worsening nerve pain to her left leg.  She does occasionally wear a brace.  She was seen by gynecology and started on spironolactone.  She did feel good on this medication when she was taking it.  Her sister has HS and she thinks she may have as well.  She has noted a lesion to her groin and some to her axillary areas.  She does have 2 healed lesions currently.  She did open 1 herself.  She would also like to get back on her citalopram.  She denies any active SI or HI.  She would like to restart all of her medications now that her insurance is effective.  She does note that she will be changing insurances once again in July.  No further complaints or concerns at this time.  Pleasant visit with the patient today.        Past Medical History:   Diagnosis Date    Adiposity 2016    Airway hyperreactivity 2016    Allergic rhinitis 2016    Anxiety     Asthma     Depression     Eczema     Hyperlipemia     Hypertension     Lactose intolerance     Obstructive sleep apnea     PAC (premature atrial contraction)     PCOS (polycystic ovarian syndrome)        Past Surgical History:   Procedure Laterality Date    COSMETIC SURGERY Right      "birth sammie removal-right cheek    WISDOM TOOTH EXTRACTION Bilateral        Social History     Socioeconomic History    Marital status: Single   Tobacco Use    Smoking status: Never    Smokeless tobacco: Never   Vaping Use    Vaping status: Never Used   Substance and Sexual Activity    Alcohol use: Not Currently    Drug use: Never    Sexual activity: Not Currently     Partners: Male     Birth control/protection: Abstinence, Birth control pill         Current Outpatient Medications:     Cholecalciferol 50 MCG (2000 UT) capsule, Take 1 capsule by mouth Daily., Disp: 90 each, Rfl: 1    citalopram (CeleXA) 20 MG tablet, Take 1 tablet by mouth Daily., Disp: 90 tablet, Rfl: 1    hydrOXYzine pamoate (VISTARIL) 25 MG capsule, Take 1 capsule by mouth Daily., Disp: 90 capsule, Rfl: 1    norethindrone (MICRONOR) 0.35 MG tablet, Take 1 tablet by mouth Daily., Disp: 84 tablet, Rfl: 1    spironolactone (Aldactone) 50 MG tablet, Take 1 tablet by mouth 2 (Two) Times a Day., Disp: 180 tablet, Rfl: 1    amLODIPine (NORVASC) 5 MG tablet, Take 1 tablet by mouth Daily., Disp: 90 tablet, Rfl: 1    propranolol (INDERAL) 10 MG tablet, Take 1 tablet by mouth 2 (Two) Times a Day As Needed (Anxiety)., Disp: 60 tablet, Rfl: 2    Objective     Vital Signs  /81   Pulse 78   Ht 167.6 cm (65.98\")   Wt (!) 143 kg (315 lb)   SpO2 96%   BMI 50.87 kg/m²   Estimated body mass index is 50.87 kg/m² as calculated from the following:    Height as of this encounter: 167.6 cm (65.98\").    Weight as of this encounter: 143 kg (315 lb).           Physical Exam  Constitutional:       General: She is not in acute distress.     Appearance: Normal appearance. She is not ill-appearing.   HENT:      Head: Normocephalic and atraumatic.      Nose: Nose normal.   Eyes:      Extraocular Movements: Extraocular movements intact.      Conjunctiva/sclera: Conjunctivae normal.      Pupils: Pupils are equal, round, and reactive to light.   Cardiovascular:      Rate " and Rhythm: Normal rate.   Pulmonary:      Effort: Pulmonary effort is normal. No respiratory distress.   Musculoskeletal:         General: Normal range of motion.      Cervical back: Neck supple.   Skin:     General: Skin is warm and dry.   Neurological:      General: No focal deficit present.      Mental Status: She is alert and oriented to person, place, and time. Mental status is at baseline.   Psychiatric:         Mood and Affect: Mood normal.         Behavior: Behavior normal.         Thought Content: Thought content normal.         Judgment: Judgment normal.          Assessment and Plan     Diagnoses and all orders for this visit:    1. Anxiety (Primary)  -     citalopram (CeleXA) 20 MG tablet; Take 1 tablet by mouth Daily.  Dispense: 90 tablet; Refill: 1  -     propranolol (INDERAL) 10 MG tablet; Take 1 tablet by mouth 2 (Two) Times a Day As Needed (Anxiety).  Dispense: 60 tablet; Refill: 2    2. Depression, unspecified depression type    3. Hypertension, unspecified type  -     amLODIPine (NORVASC) 5 MG tablet; Take 1 tablet by mouth Daily.  Dispense: 90 tablet; Refill: 1  -     propranolol (INDERAL) 10 MG tablet; Take 1 tablet by mouth 2 (Two) Times a Day As Needed (Anxiety).  Dispense: 60 tablet; Refill: 2    4. Hyperlipidemia, unspecified hyperlipidemia type    5. Female hirsutism  -     spironolactone (Aldactone) 50 MG tablet; Take 1 tablet by mouth 2 (Two) Times a Day.  Dispense: 180 tablet; Refill: 1    6. PCOS (polycystic ovarian syndrome)  -     norethindrone (MICRONOR) 0.35 MG tablet; Take 1 tablet by mouth Daily.  Dispense: 84 tablet; Refill: 1    7. Left foot pain  -     Ambulatory Referral to Orthopedic Surgery    8. Ankle weakness  -     Ambulatory Referral to Orthopedic Surgery    9. Encounter for dietary consultation  -     Ambulatory Referral to Nutrition Services        Follow Up  Return in about 4 weeks (around 6/26/2024) for Recheck.    JANETTE Hale    Part of this note may be an  electronic transcription/translation of spoken language to printed text using the Dragon Dictation System.  Answers submitted by the patient for this visit:  Primary Reason for Visit (Submitted on 5/28/2024)  What is the primary reason for your visit?: Extremity Pain  Lower Extremity Injury Questionnaire (Submitted on 5/28/2024)  Chief Complaint: Extremity pain  Injury: No  Pain location: left lower leg  Pain quality: burning, shooting, stabbing  Pain - numeric: 7/10  Progression since onset: comes and goes  tingling: No  inability to bear weight: Yes  numbness: No  lower extremity swelling: No  redness: No  Additional information: I have an extra toe in my foot from seeing a specialist. She found it in an x-ray of my sprained ankle 2 years ago. The pain is getting more frequent due to weight gain probably. There was nothing else she could do.

## 2024-06-26 ENCOUNTER — OFFICE VISIT (OUTPATIENT)
Dept: INTERNAL MEDICINE | Facility: CLINIC | Age: 24
End: 2024-06-26
Payer: COMMERCIAL

## 2024-06-26 VITALS
SYSTOLIC BLOOD PRESSURE: 134 MMHG | BODY MASS INDEX: 47.09 KG/M2 | HEART RATE: 90 BPM | TEMPERATURE: 97.3 F | RESPIRATION RATE: 20 BRPM | HEIGHT: 66 IN | OXYGEN SATURATION: 100 % | WEIGHT: 293 LBS | DIASTOLIC BLOOD PRESSURE: 84 MMHG

## 2024-06-26 DIAGNOSIS — Z09 FOLLOW-UP EXAMINATION: Primary | ICD-10-CM

## 2024-06-26 DIAGNOSIS — J45.20 INTERMITTENT ASTHMA WITHOUT COMPLICATION, UNSPECIFIED ASTHMA SEVERITY: ICD-10-CM

## 2024-06-26 DIAGNOSIS — F41.9 ANXIETY: ICD-10-CM

## 2024-06-26 DIAGNOSIS — R06.00 DYSPNEA, UNSPECIFIED TYPE: ICD-10-CM

## 2024-06-26 DIAGNOSIS — Z77.120 MOLD EXPOSURE: ICD-10-CM

## 2024-06-26 DIAGNOSIS — Z77.120 MOLD SUSPECTED EXPOSURE: ICD-10-CM

## 2024-06-26 DIAGNOSIS — R06.2 WHEEZING: ICD-10-CM

## 2024-06-26 DIAGNOSIS — R09.81 NASAL CONGESTION: ICD-10-CM

## 2024-06-26 PROCEDURE — 99214 OFFICE O/P EST MOD 30 MIN: CPT | Performed by: NURSE PRACTITIONER

## 2024-06-26 NOTE — LETTER
June 26, 2024     Patient: Avel Pichardo   YOB: 2000   Date of Visit: 6/26/2024       To Whom It May Concern:    Avel Pichardo was seen and evaluated by myself in the office today. She is experiencing upper respiratory symptoms associated with suspected mold exposure from her apartment. This is potentially dangerous to her health and well being, especially given her history of asthma. I recommend that she be moved to a different apartment that is mold free to reduce her risk of potential and serious respiratory issues. Please contact me with any questions or concerns. Thank you for your prompt attention to this matter.         Sincerely,        JANETTE Hale
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
## 2024-06-27 ENCOUNTER — TELEPHONE (OUTPATIENT)
Dept: INTERNAL MEDICINE | Facility: CLINIC | Age: 24
End: 2024-06-27
Payer: COMMERCIAL

## 2024-06-27 RX ORDER — VORICONAZOLE 200 MG/1
200 TABLET, FILM COATED ORAL 2 TIMES DAILY
Qty: 20 TABLET | Refills: 0 | Status: SHIPPED | OUTPATIENT
Start: 2024-06-27 | End: 2024-07-07

## 2024-06-27 RX ORDER — GUAIFENESIN 600 MG/1
600 TABLET, EXTENDED RELEASE ORAL 2 TIMES DAILY
Qty: 20 TABLET | Refills: 0 | Status: SHIPPED | OUTPATIENT
Start: 2024-06-27 | End: 2024-07-07

## 2024-06-27 RX ORDER — ALBUTEROL SULFATE 90 UG/1
2 AEROSOL, METERED RESPIRATORY (INHALATION) EVERY 4 HOURS PRN
Qty: 18 G | Refills: 5 | Status: SHIPPED | OUTPATIENT
Start: 2024-06-27

## 2024-06-27 NOTE — TELEPHONE ENCOUNTER
left detailed VM for pt to notify her that the medication has been sent to her pharmacy. left office number if she has any questions.

## 2024-07-01 ENCOUNTER — PRIOR AUTHORIZATION (OUTPATIENT)
Dept: INTERNAL MEDICINE | Facility: CLINIC | Age: 24
End: 2024-07-01
Payer: COMMERCIAL

## 2024-08-29 DIAGNOSIS — F41.9 ANXIETY: ICD-10-CM

## 2024-08-29 DIAGNOSIS — I10 HYPERTENSION, UNSPECIFIED TYPE: ICD-10-CM

## 2024-08-29 RX ORDER — PROPRANOLOL HYDROCHLORIDE 10 MG/1
10 TABLET ORAL 2 TIMES DAILY PRN
Qty: 60 TABLET | Refills: 0 | Status: SHIPPED | OUTPATIENT
Start: 2024-08-29

## 2024-10-02 ENCOUNTER — OFFICE VISIT (OUTPATIENT)
Dept: OBSTETRICS AND GYNECOLOGY | Facility: CLINIC | Age: 24
End: 2024-10-02
Payer: COMMERCIAL

## 2024-10-02 VITALS
BODY MASS INDEX: 47.09 KG/M2 | DIASTOLIC BLOOD PRESSURE: 88 MMHG | WEIGHT: 293 LBS | HEIGHT: 66 IN | SYSTOLIC BLOOD PRESSURE: 122 MMHG

## 2024-10-02 DIAGNOSIS — L68.0 FEMALE HIRSUTISM: ICD-10-CM

## 2024-10-02 DIAGNOSIS — E28.2 PCOS (POLYCYSTIC OVARIAN SYNDROME): ICD-10-CM

## 2024-10-02 DIAGNOSIS — Z12.4 SCREENING FOR CERVICAL CANCER: ICD-10-CM

## 2024-10-02 DIAGNOSIS — Z11.3 SCREEN FOR STD (SEXUALLY TRANSMITTED DISEASE): ICD-10-CM

## 2024-10-02 DIAGNOSIS — Z01.419 WOMEN'S ANNUAL ROUTINE GYNECOLOGICAL EXAMINATION: Primary | ICD-10-CM

## 2024-10-02 LAB
25(OH)D3+25(OH)D2 SERPL-MCNC: 16.1 NG/ML (ref 30–100)
ALBUMIN SERPL-MCNC: 4.6 G/DL (ref 3.5–5.2)
ALBUMIN/GLOB SERPL: 1.6 G/DL
ALP SERPL-CCNC: 77 U/L (ref 39–117)
ALT SERPL-CCNC: 26 U/L (ref 1–33)
AST SERPL-CCNC: 19 U/L (ref 1–32)
BASOPHILS # BLD AUTO: 0.05 10*3/MM3 (ref 0–0.2)
BASOPHILS NFR BLD AUTO: 0.8 % (ref 0–1.5)
BILIRUB SERPL-MCNC: 0.9 MG/DL (ref 0–1.2)
BUN SERPL-MCNC: 13 MG/DL (ref 6–20)
BUN/CREAT SERPL: 15.9 (ref 7–25)
CALCIUM SERPL-MCNC: 9.5 MG/DL (ref 8.6–10.5)
CHLORIDE SERPL-SCNC: 100 MMOL/L (ref 98–107)
CHOLEST SERPL-MCNC: 264 MG/DL (ref 0–200)
CO2 SERPL-SCNC: 25.3 MMOL/L (ref 22–29)
CREAT SERPL-MCNC: 0.82 MG/DL (ref 0.57–1)
EGFRCR SERPLBLD CKD-EPI 2021: 102.6 ML/MIN/1.73
EOSINOPHIL # BLD AUTO: 0.17 10*3/MM3 (ref 0–0.4)
EOSINOPHIL NFR BLD AUTO: 2.7 % (ref 0.3–6.2)
ERYTHROCYTE [DISTWIDTH] IN BLOOD BY AUTOMATED COUNT: 12.5 % (ref 12.3–15.4)
GLOBULIN SER CALC-MCNC: 2.9 GM/DL
GLUCOSE SERPL-MCNC: 93 MG/DL (ref 65–99)
HBA1C MFR BLD: 5.8 % (ref 4.8–5.6)
HCT VFR BLD AUTO: 46.3 % (ref 34–46.6)
HDLC SERPL-MCNC: 40 MG/DL (ref 40–60)
HGB BLD-MCNC: 14.4 G/DL (ref 12–15.9)
IMM GRANULOCYTES # BLD AUTO: 0.02 10*3/MM3 (ref 0–0.05)
IMM GRANULOCYTES NFR BLD AUTO: 0.3 % (ref 0–0.5)
LDLC SERPL CALC-MCNC: 206 MG/DL (ref 0–100)
LYMPHOCYTES # BLD AUTO: 2.25 10*3/MM3 (ref 0.7–3.1)
LYMPHOCYTES NFR BLD AUTO: 36.3 % (ref 19.6–45.3)
MCH RBC QN AUTO: 29.1 PG (ref 26.6–33)
MCHC RBC AUTO-ENTMCNC: 31.1 G/DL (ref 31.5–35.7)
MCV RBC AUTO: 93.7 FL (ref 79–97)
MONOCYTES # BLD AUTO: 0.64 10*3/MM3 (ref 0.1–0.9)
MONOCYTES NFR BLD AUTO: 10.3 % (ref 5–12)
NEUTROPHILS # BLD AUTO: 3.06 10*3/MM3 (ref 1.7–7)
NEUTROPHILS NFR BLD AUTO: 49.6 % (ref 42.7–76)
NRBC BLD AUTO-RTO: 0 /100 WBC (ref 0–0.2)
PLATELET # BLD AUTO: 311 10*3/MM3 (ref 140–450)
POTASSIUM SERPL-SCNC: 4.7 MMOL/L (ref 3.5–5.2)
PROT SERPL-MCNC: 7.5 G/DL (ref 6–8.5)
RBC # BLD AUTO: 4.94 10*6/MM3 (ref 3.77–5.28)
SODIUM SERPL-SCNC: 139 MMOL/L (ref 136–145)
TRIGL SERPL-MCNC: 102 MG/DL (ref 0–150)
TSH SERPL DL<=0.005 MIU/L-ACNC: 1.79 UIU/ML (ref 0.27–4.2)
VLDLC SERPL CALC-MCNC: 18 MG/DL (ref 5–40)
WBC # BLD AUTO: 6.19 10*3/MM3 (ref 3.4–10.8)

## 2024-10-02 RX ORDER — SPIRONOLACTONE 50 MG/1
50 TABLET, FILM COATED ORAL 2 TIMES DAILY
Qty: 180 TABLET | Refills: 1 | Status: SHIPPED | OUTPATIENT
Start: 2024-10-02

## 2024-10-02 RX ORDER — DROSPIRENONE 4 MG/1
1 TABLET, FILM COATED ORAL DAILY
Qty: 84 TABLET | Refills: 4 | Status: SHIPPED | OUTPATIENT
Start: 2024-10-02

## 2024-10-02 NOTE — PROGRESS NOTES
Gynecologic Annual Exam Note        Gynecologic Exam        Subjective     HPI  Avel Pichardo is a 24 y.o.  female who presents for annual well woman exam as a established patient. There were no changes to her medical or surgical history since her last visit.. Patient's last menstrual period was 2024 (approximate).  Irregular cycles  The flow is heavy. She reports dysmenorrhea is moderate occurring 1 week before flow. Rated 4/10, intermittent cramping. Does not take anything for pain relief. Marital Status: single.  She is sexually active. She has not had new partners.. STD testing recommendations have been explained to the patient and she does desire STD testing.    The patient would like to discuss the following complaints today: very irregular cycles while on micronor. C/o menorrhagia and dysmenorrhea. Patient stopped micronor about 2 months ago. No IC x 1 year. Denies concern for pregnancy.    Additional OB/GYN History   contraceptive methods: Oral progesterone-only contraceptive  Desires to: continue contraception  Thromboembolic Disease: none  History of migraines: no  Age of menarche: 14    History of STD: no    Last Pap : 23. Results: ASCUS. HPV: negative.   Last Completed Pap Smear       This patient has no relevant Health Maintenance data.             History of abnormal Pap smear: yes - ASCUS  Gardasil status: no  Family history of uterine, colon, breast, or ovarian cancer: no  Performs monthly Self-Breast Exam: no  Exercises Regularly:sometimes  Feelings of Anxiety or Depression: yes - on medication  Tobacco Usage?: No       Current Outpatient Medications:     albuterol sulfate  (90 Base) MCG/ACT inhaler, Inhale 2 puffs Every 4 (Four) Hours As Needed for Wheezing or Shortness of Air., Disp: 18 g, Rfl: 5    amLODIPine (NORVASC) 5 MG tablet, Take 1 tablet by mouth Daily., Disp: 90 tablet, Rfl: 1    Cholecalciferol 50 MCG (2000) capsule, Take 1 capsule by mouth Daily.,  Disp: 90 each, Rfl: 1    citalopram (CeleXA) 20 MG tablet, Take 1 tablet by mouth Daily., Disp: 90 tablet, Rfl: 1    hydrOXYzine pamoate (VISTARIL) 25 MG capsule, Take 1 capsule by mouth Daily., Disp: 90 capsule, Rfl: 1    propranolol (INDERAL) 10 MG tablet, TAKE 1 TABLET BY MOUTH TWICE A DAY AS NEEDED FOR ANXIETY, Disp: 60 tablet, Rfl: 0    spironolactone (Aldactone) 50 MG tablet, Take 1 tablet by mouth 2 (Two) Times a Day., Disp: 180 tablet, Rfl: 1    Drospirenone (Slynd) 4 MG tablet, Take 1 tablet by mouth Daily., Disp: 84 tablet, Rfl: 4     Patient is requesting refills of Spironolactone.    OB History          0    Para   0    Term   0       0    AB   0    Living   0         SAB   0    IAB   0    Ectopic   0    Molar   0    Multiple   0    Live Births   0                Health Maintenance   Topic Date Due    Pneumococcal Vaccine 0-64 (1 of 2 - PCV) Never done    HPV VACCINES (1 - 3-dose series) Never done    HEPATITIS C SCREENING  Never done    ANNUAL PHYSICAL  2019    TDAP/TD VACCINES (2 - Td or Tdap) 2021    LIPID PANEL  2024    INFLUENZA VACCINE  2024    COVID-19 Vaccine (2023- season) 2024    PAP SMEAR  2024    Annual Gynecologic Pelvic and Breast Exam  2024    BMI FOLLOWUP  2025    CHLAMYDIA SCREENING  Discontinued       Past Medical History:   Diagnosis Date    Adiposity 2016    Airway hyperreactivity 2016    Allergic rhinitis 2016    Anxiety     Asthma     Depression     Eczema     Hyperlipemia     Hypertension     Lactose intolerance     Obstructive sleep apnea     PAC (premature atrial contraction)     PCOS (polycystic ovarian syndrome)         Past Surgical History:   Procedure Laterality Date    COSMETIC SURGERY Right     birth sammie removal-right cheek    WISDOM TOOTH EXTRACTION Bilateral        The additional following portions of the patient's history were reviewed and updated as appropriate: allergies, current  "medications, past family history, past medical history, past social history, past surgical history, and problem list.    Review of Systems   Respiratory: Negative.  Negative for shortness of breath.    Cardiovascular: Negative.  Negative for chest pain.   Gastrointestinal: Negative.  Negative for abdominal distention, abdominal pain and constipation.   Genitourinary:  Positive for menstrual problem (PCOS) and vaginal discharge. Negative for breast discharge, breast lump, breast pain, dyspareunia, dysuria, pelvic pain, pelvic pressure, urinary incontinence, vaginal bleeding and vaginal pain.   Psychiatric/Behavioral:  Positive for depressed mood. Negative for suicidal ideas. The patient is nervous/anxious.         PCP prescribed medications.          I have reviewed and agree with the HPI, ROS, and historical information as entered above. Jenny Coleman, APRN          Objective   /88   Ht 167.6 cm (66\")   Wt (!) 140 kg (308 lb 3.2 oz)   LMP 08/01/2024 (Approximate)   BMI 49.74 kg/m²     Physical Exam  Vitals and nursing note reviewed. Exam conducted with a chaperone present.   Constitutional:       General: She is not in acute distress.     Appearance: Normal appearance. She is well-developed. She is not ill-appearing or toxic-appearing.   HENT:      Head: Normocephalic and atraumatic.   Neck:      Thyroid: No thyroid mass, thyromegaly or thyroid tenderness.   Pulmonary:      Effort: Pulmonary effort is normal. No retractions.   Chest:      Chest wall: No mass.   Breasts:     Breasts are symmetrical.      Right: Normal. No mass, nipple discharge, skin change or tenderness.      Left: Normal. No mass, nipple discharge, skin change or tenderness.   Abdominal:      Palpations: Abdomen is soft. Abdomen is not rigid. There is no mass.      Tenderness: There is no abdominal tenderness. There is no guarding or rebound.      Hernia: No hernia is present. There is no hernia in the left inguinal area or right " inguinal area.   Genitourinary:     General: Normal vulva.      Labia:         Right: No rash, tenderness or lesion.         Left: No rash, tenderness or lesion.       Vagina: Normal. No vaginal discharge, erythema, tenderness, bleeding or lesions.      Cervix: No cervical motion tenderness, discharge, friability, lesion, erythema or cervical bleeding.      Uterus: Normal. Not enlarged, not fixed and not tender.       Adnexa: Right adnexa normal and left adnexa normal.        Right: No mass or tenderness.          Left: No mass or tenderness.        Rectum: No external hemorrhoid.   Musculoskeletal:      Cervical back: Normal range of motion. No muscular tenderness.   Lymphadenopathy:      Upper Body:      Right upper body: No supraclavicular or axillary adenopathy.      Left upper body: No supraclavicular or axillary adenopathy.   Neurological:      Mental Status: She is alert and oriented to person, place, and time.   Psychiatric:         Mood and Affect: Mood normal.         Behavior: Behavior normal.            Assessment and Plan    Problem List Items Addressed This Visit       Female hirsutism    Overview     Begin Spironolactone. Will begin with 50 mg twice daily and increase to 100 mg twice daily as needed. Will assess response at 6-month intervals before adjusting dose.    Informed contraception is essential because, if pregnancy occurs, an antiandrogen such as spironolactone could prevent development of normal external genitalia in a male fetus. Pt CLARISSE.     CMP @ next f/u         Relevant Medications    spironolactone (Aldactone) 50 MG tablet    Other Relevant Orders    TSH Rfx On Abnormal To Free T4    PCOS (polycystic ovarian syndrome)    Overview     Not a great candidate for JOHNATHAN due to BMI 48.99, HLD, HTN, PCOS (high risk for DM). Will continue POP and begin Spironolactone for hirsutism.           Relevant Medications    Drospirenone (Slynd) 4 MG tablet    Other Relevant Orders    Hemoglobin A1c      Other Visit Diagnoses       Women's annual routine gynecological examination    -  Primary    Relevant Orders    LIQUID-BASED PAP SMEAR WITH HPV GENOTYPING IF ASCUS ([facility])    CBC & Differential    Comprehensive Metabolic Panel    Vitamin D 25 Hydroxy    Lipid Panel    TSH Rfx On Abnormal To Free T4    Hemoglobin A1c    RPR, Rfx Qn RPR / Confirm TP    Hepatitis B Surface Antigen    Hepatitis C Antibody    HIV-1 / O / 2 Ag / Antibody 4th Generation    Screen for STD (sexually transmitted disease)        Relevant Orders    LIQUID-BASED PAP SMEAR WITH HPV GENOTYPING IF ASCUS ([facility]ETHA,COR,MAD)    RPR, Rfx Qn RPR / Confirm TP    Hepatitis B Surface Antigen    Hepatitis C Antibody    HIV-1 / O / 2 Ag / Antibody 4th Generation    Screening for cervical cancer        Relevant Orders    LIQUID-BASED PAP SMEAR WITH HPV GENOTYPING IF ASCUS (ALETHA,COR,MAD)            GYN annual well woman exam.   Reviewed pap guidelines. Pap with STI testing.   PCOS. Discussed importance of having menstrual cycles at least Q3 mons to prevent hyperplasia and carcinoma. Progesterone only options reviewed with patient. She is considering an IUD. Advised no unprotected IC 2 weeks prior to insertion, IBU prior to insertion appt, prefer to insert while bleeding but with unpredictable bleeding can insert without cycle. She desires Slynd for management at this time. Benefits, risks, and potential se reviewed with patient. Sample given, Lot: [id number], Exp 1/2027. She will call if she desires IUD insertion.  Reviewed monthly self breast exams.  Instructed to call with lumps, pain, or breast discharge.    Reviewed BMI, exercise, and weight loss as preventative health measures. Patient states PCP plans to start her on Wegovy.   Spironolactone prescribed. Patient reminded she has to use some form of contraception while taking due to pregnancy risks. She VU.   Reccommended Flu Vaccine in Fall of each year.  NSAIDs, Tylenol, and heat PRN pain.   Slynd,  Spironolactone, and IUD education included in patient instructions.   Labs ordered. Annual and STD blood panel.   Return in about 1 year (around 10/2/2025) for Annual physical or sooner if needed.    Jenny Coleman, APRN  10/02/2024

## 2024-10-03 LAB
HBV SURFACE AG SERPL QL IA: NEGATIVE
HCV IGG SERPL QL IA: NON REACTIVE
HIV 1+2 AB+HIV1 P24 AG SERPL QL IA: NON REACTIVE
RPR SER QL: NON REACTIVE

## 2024-10-10 LAB — REF LAB TEST METHOD: NORMAL

## 2025-04-09 ENCOUNTER — OFFICE VISIT (OUTPATIENT)
Dept: INTERNAL MEDICINE | Facility: CLINIC | Age: 25
End: 2025-04-09
Payer: COMMERCIAL

## 2025-04-09 VITALS
OXYGEN SATURATION: 98 % | WEIGHT: 290.8 LBS | HEART RATE: 77 BPM | BODY MASS INDEX: 46.73 KG/M2 | DIASTOLIC BLOOD PRESSURE: 78 MMHG | HEIGHT: 66 IN | SYSTOLIC BLOOD PRESSURE: 138 MMHG

## 2025-04-09 DIAGNOSIS — M79.605 LEFT LEG PAIN: Primary | ICD-10-CM

## 2025-04-09 DIAGNOSIS — M79.89 LEFT LEG SWELLING: ICD-10-CM

## 2025-04-09 RX ORDER — SEMAGLUTIDE 2.4 MG/.75ML
INJECTION, SOLUTION SUBCUTANEOUS
COMMUNITY
Start: 2025-03-21

## 2025-04-09 NOTE — PROGRESS NOTES
Office Note     Name: Avel Pichardo    : 2000     MRN: [id number]     Chief Complaint  Pain (Nerve pain in left leg)    Subjective     History of Present Illness:  Avel Pichardo is a 25 y.o. female who presents today for an acute visit with complaint of left calf pain. 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 She was instructed to wear arch supporting shoes and and to follow with PT. She does have supportive shoes and has a  that she works with four times per week with emphasis on ankle strengthening. Recently pain has become more noticeable and localized to left medial side of the calf. Pt reports 2 small nodules in this area that seem to be the focal point of pain, that when touched, feels like she is being stabbed. She also reports muscle cramps in affected calf about every 2 hours. Pt denies personal or family history of blood clots. Does not smoke. No longer on birth control.       Past Medical History:   Diagnosis Date    Adiposity 2016    Airway hyperreactivity 2016    Allergic rhinitis 2016    Anxiety     Asthma     Depression     Eczema     Hyperlipemia     Hypertension     Lactose intolerance     Obstructive sleep apnea     PAC (premature atrial contraction)     PCOS (polycystic ovarian syndrome)     Polycystic ovary syndrome        Past Surgical History:   Procedure Laterality Date    COSMETIC SURGERY Right     birth sammie removal-right cheek    WISDOM TOOTH EXTRACTION Bilateral        Social History     Socioeconomic History    Marital status: Single   Tobacco Use    Smoking status: Never    Smokeless tobacco: Never   Vaping Use    Vaping status: Never Used   Substance and Sexual Activity    Alcohol use: Not Currently    Drug use: Never    Sexual activity: Not Currently     Partners: Male     Birth control/protection:  "Abstinence, Birth control pill         Current Outpatient Medications:     amLODIPine (NORVASC) 5 MG tablet, Take 1 tablet by mouth Daily., Disp: 90 tablet, Rfl: 1    Cholecalciferol 50 MCG (2000 UT) capsule, Take 1 capsule by mouth Daily., Disp: 90 each, Rfl: 1    citalopram (CeleXA) 20 MG tablet, Take 1 tablet by mouth Daily., Disp: 90 tablet, Rfl: 1    propranolol (INDERAL) 10 MG tablet, TAKE 1 TABLET BY MOUTH TWICE A DAY AS NEEDED FOR ANXIETY, Disp: 60 tablet, Rfl: 0    spironolactone (Aldactone) 50 MG tablet, Take 1 tablet by mouth 2 (Two) Times a Day., Disp: 180 tablet, Rfl: 1    Wegovy 2.4 MG/0.75ML solution auto-injector, INJECT 2.4 MG SUBCUTANEOUSLY ONCE WEEKLY, Disp: , Rfl:     albuterol sulfate  (90 Base) MCG/ACT inhaler, Inhale 2 puffs Every 4 (Four) Hours As Needed for Wheezing or Shortness of Air. (Patient not taking: Reported on 4/9/2025), Disp: 18 g, Rfl: 5    hydrOXYzine pamoate (VISTARIL) 25 MG capsule, Take 1 capsule by mouth Daily. (Patient not taking: Reported on 4/9/2025), Disp: 90 capsule, Rfl: 1    Objective     Vital Signs  /78   Pulse 77   Ht 167.6 cm (65.98\")   Wt 132 kg (290 lb 12.8 oz)   SpO2 98%   BMI 46.96 kg/m²   Estimated body mass index is 46.96 kg/m² as calculated from the following:    Height as of this encounter: 167.6 cm (65.98\").    Weight as of this encounter: 132 kg (290 lb 12.8 oz).           Physical Exam  Vitals and nursing note reviewed.   Constitutional:       General: She is not in acute distress.     Appearance: Normal appearance. She is well-developed and well-groomed. She is not ill-appearing.   HENT:      Head: Normocephalic.   Cardiovascular:      Rate and Rhythm: Normal rate and regular rhythm.      Heart sounds: Normal heart sounds.   Pulmonary:      Effort: Pulmonary effort is normal.      Breath sounds: Normal breath sounds.   Musculoskeletal:      Right lower leg: No swelling. No edema.      Left lower leg: Swelling present. No edema.      " Right ankle: Normal.      Left ankle: Normal.      Right foot: Normal.      Left foot: Normal.        Legs:       Comments: 1 small, round skin colored cutaneous nodule noted on skin of calf; no pain on palpation  1 small, round subcutaneous nodule felt under skin of calf; burning pain reproduced on palpation    Skin:     General: Skin is warm and dry.      Findings: No bruising, ecchymosis, erythema, laceration or rash.   Neurological:      General: No focal deficit present.      Mental Status: She is alert and oriented to person, place, and time.      Gait: Gait is intact.   Psychiatric:         Attention and Perception: Attention and perception normal.         Mood and Affect: Mood and affect normal.         Behavior: Behavior normal. Behavior is cooperative.         Thought Content: Thought content normal.         Judgment: Judgment normal.          Assessment and Plan     Diagnoses and all orders for this visit:    1. Left leg pain (Primary)  -     Duplex Venous Lower Extremity - Left CAR; Future    2. Left leg swelling  -     Duplex Venous Lower Extremity - Left CAR; Future    Plan:  Will order venous doppler scan of LLE to rule out blood clots.     Follow Up  Return in about 6 months (around 10/9/2025) for Annual.      JANETTE Hale    Agree with assessment, documentation, and plan of care formulated by JANETTE Sahu student.    Part of this note may be an electronic transcription/translation of spoken language to printed text using the Dragon Dictation System.  Answers submitted by the patient for this visit:  Lower Extremity Injury Questionnaire (Submitted on 4/9/2025)  Chief Complaint: Extremity pain  Injury: No  Pain location: left lower leg  Pain quality: burning, shooting, stabbing  Pain - numeric: 9/10  Progression since onset: comes and goes  tingling: No  inability to bear weight: Yes  numbness: No  lower extremity swelling: Yes  redness: No  Additional information: There is 2 small nodules  on the inside of the lower left lleg where the pain radiates from just fron touch.

## 2025-04-16 ENCOUNTER — TELEPHONE (OUTPATIENT)
Dept: INTERNAL MEDICINE | Facility: CLINIC | Age: 25
End: 2025-04-16
Payer: COMMERCIAL

## 2025-04-16 NOTE — TELEPHONE ENCOUNTER
UNABLE TO ADDEND PATIENT MESSAGES    Spoke with patient and sent mychart message with central scheduling's phone number so she can reach out to them to schedule if she doesn't want to wait for them to reach out to her. Did advise that it can take 10-14 days for central scheduling to reach out. Patient verbalized understanding.

## 2025-04-24 ENCOUNTER — HOSPITAL ENCOUNTER (OUTPATIENT)
Dept: CARDIOLOGY | Facility: HOSPITAL | Age: 25
Discharge: HOME | End: 2025-04-24
Admitting: NURSE PRACTITIONER
Payer: COMMERCIAL

## 2025-04-24 DIAGNOSIS — M79.605 LEFT LEG PAIN: ICD-10-CM

## 2025-04-24 DIAGNOSIS — M79.89 LEFT LEG SWELLING: ICD-10-CM

## 2025-04-24 LAB
BH CV LOWER VASCULAR LEFT COMMON FEMORAL AUGMENT: NORMAL
BH CV LOWER VASCULAR LEFT COMMON FEMORAL COMPRESS: NORMAL
BH CV LOWER VASCULAR LEFT COMMON FEMORAL PHASIC: NORMAL
BH CV LOWER VASCULAR LEFT COMMON FEMORAL SPONT: NORMAL
BH CV LOWER VASCULAR LEFT DISTAL FEMORAL AUGMENT: NORMAL
BH CV LOWER VASCULAR LEFT DISTAL FEMORAL COMPRESS: NORMAL
BH CV LOWER VASCULAR LEFT DISTAL FEMORAL PHASIC: NORMAL
BH CV LOWER VASCULAR LEFT DISTAL FEMORAL SPONT: NORMAL
BH CV LOWER VASCULAR LEFT GASTRONEMIUS COMPRESS: NORMAL
BH CV LOWER VASCULAR LEFT GREATER SAPH AK COMPRESS: NORMAL
BH CV LOWER VASCULAR LEFT GREATER SAPH BK COMPRESS: NORMAL
BH CV LOWER VASCULAR LEFT LESSER SAPH COMPRESS: NORMAL
BH CV LOWER VASCULAR LEFT MID FEMORAL AUGMENT: NORMAL
BH CV LOWER VASCULAR LEFT MID FEMORAL COMPRESS: NORMAL
BH CV LOWER VASCULAR LEFT MID FEMORAL PHASIC: NORMAL
BH CV LOWER VASCULAR LEFT MID FEMORAL SPONT: NORMAL
BH CV LOWER VASCULAR LEFT PERONEAL COMPRESS: NORMAL
BH CV LOWER VASCULAR LEFT POPLITEAL AUGMENT: NORMAL
BH CV LOWER VASCULAR LEFT POPLITEAL COMPRESS: NORMAL
BH CV LOWER VASCULAR LEFT POPLITEAL PHASIC: NORMAL
BH CV LOWER VASCULAR LEFT POPLITEAL SPONT: NORMAL
BH CV LOWER VASCULAR LEFT POSTERIOR TIBIAL COMPRESS: NORMAL
BH CV LOWER VASCULAR LEFT PROFUNDA FEMORAL COMPRESS: NORMAL
BH CV LOWER VASCULAR LEFT PROXIMAL FEMORAL AUGMENT: NORMAL
BH CV LOWER VASCULAR LEFT PROXIMAL FEMORAL COMPRESS: NORMAL
BH CV LOWER VASCULAR LEFT PROXIMAL FEMORAL PHASIC: NORMAL
BH CV LOWER VASCULAR LEFT PROXIMAL FEMORAL SPONT: NORMAL
BH CV LOWER VASCULAR LEFT SAPHENOFEMORAL JUNCTION AUGMENT: NORMAL
BH CV LOWER VASCULAR LEFT SAPHENOFEMORAL JUNCTION COMPRESS: NORMAL
BH CV LOWER VASCULAR LEFT SAPHENOFEMORAL JUNCTION PHASIC: NORMAL
BH CV LOWER VASCULAR LEFT SAPHENOFEMORAL JUNCTION SPONT: NORMAL
BH CV LOWER VASCULAR RIGHT COMMON FEMORAL AUGMENT: NORMAL
BH CV LOWER VASCULAR RIGHT COMMON FEMORAL PHASIC: NORMAL
BH CV LOWER VASCULAR RIGHT COMMON FEMORAL SPONT: NORMAL

## 2025-04-24 PROCEDURE — 93971 EXTREMITY STUDY: CPT

## 2025-06-20 DIAGNOSIS — L68.0 FEMALE HIRSUTISM: ICD-10-CM

## 2025-06-20 RX ORDER — SPIRONOLACTONE 50 MG/1
50 TABLET, FILM COATED ORAL 2 TIMES DAILY
Qty: 180 TABLET | Refills: 1 | OUTPATIENT
Start: 2025-06-20